# Patient Record
Sex: MALE | Race: WHITE | NOT HISPANIC OR LATINO | Employment: OTHER | URBAN - METROPOLITAN AREA
[De-identification: names, ages, dates, MRNs, and addresses within clinical notes are randomized per-mention and may not be internally consistent; named-entity substitution may affect disease eponyms.]

---

## 2018-08-21 ENCOUNTER — HOME CARE/HOSPICE - HEALTHEAST (OUTPATIENT)
Dept: HOME HEALTH SERVICES | Facility: HOME HEALTH | Age: 53
End: 2018-08-21

## 2021-05-31 ENCOUNTER — RECORDS - HEALTHEAST (OUTPATIENT)
Dept: ADMINISTRATIVE | Facility: CLINIC | Age: 56
End: 2021-05-31

## 2021-06-16 PROBLEM — Z95.2 S/P AVR (AORTIC VALVE REPLACEMENT): Status: ACTIVE | Noted: 2018-08-21

## 2021-06-16 PROBLEM — S80.11XA HEMATOMA OF RIGHT LOWER EXTREMITY: Status: ACTIVE | Noted: 2018-08-21

## 2021-06-16 PROBLEM — G62.9 NEUROPATHY: Status: ACTIVE | Noted: 2018-08-21

## 2021-06-16 PROBLEM — K59.1 FUNCTIONAL DIARRHEA: Status: ACTIVE | Noted: 2018-08-21

## 2021-06-16 PROBLEM — F41.9 ANXIETY: Status: ACTIVE | Noted: 2018-08-21

## 2021-06-16 PROBLEM — R23.3 SPONTANEOUS HEMATOMA OF LOWER LEG: Status: ACTIVE | Noted: 2018-08-21

## 2021-06-16 PROBLEM — N17.9 ACUTE RENAL INJURY (H): Status: ACTIVE | Noted: 2018-08-17

## 2021-07-03 ENCOUNTER — HEALTH MAINTENANCE LETTER (OUTPATIENT)
Age: 56
End: 2021-07-03

## 2021-10-23 ENCOUNTER — HEALTH MAINTENANCE LETTER (OUTPATIENT)
Age: 56
End: 2021-10-23

## 2021-11-05 ENCOUNTER — TRANSCRIBE ORDERS (OUTPATIENT)
Dept: OTHER | Age: 56
End: 2021-11-05

## 2021-11-05 ENCOUNTER — TELEPHONE (OUTPATIENT)
Dept: OPHTHALMOLOGY | Facility: CLINIC | Age: 56
End: 2021-11-05
Payer: MEDICARE

## 2021-11-05 DIAGNOSIS — H54.7 VISION LOSS: Primary | ICD-10-CM

## 2021-11-05 NOTE — TELEPHONE ENCOUNTER
[facility] Call Center    Phone Message    May a detailed message be left on voicemail: no     Reason for Call: Appointment Intake    Referring physicians name: Barbie Buckner CNP  Clinic name: [facility]  Clinic phone #: [phone]  Clinic fax #: [phone]  Clinic contact name: Shaina  Clinic contact's direct #: [phone]  Diagnosis and/or Symptoms: Vision loss [H54.7]  My Clinical Question Is:   Dr. Cooper - Vision loss after intracerebral hemorrhage, Right-sided hemianopsia, also difficulty on left side. Central vision spared. Difficulty crossing midline        Action Taken: Message routed to:  [facility] [[id number]] - not in Dr. Cooper's protos and Vision loss is listed as Red Flag    Travel Screening: Not Applicable

## 2021-11-08 NOTE — TELEPHONE ENCOUNTER
Called and unable to leave a voice message due to mailbox not set up. Benson needs an appt with Dr. Cooper for next available.     Susana Kern Communication Facilitator on 11/8/2021 at 11:29 AM

## 2021-11-11 ENCOUNTER — TELEPHONE (OUTPATIENT)
Dept: OPHTHALMOLOGY | Facility: CLINIC | Age: 56
End: 2021-11-11
Payer: MEDICARE

## 2021-11-11 NOTE — TELEPHONE ENCOUNTER
Called and spoke to Nato. I asked him if this was a good time to call him. Nato asked if we could call him another time to make an appt with Dr. Cooper.     Susana Kern Communication Facilitator on 11/11/2021 at 9:46 AM

## 2021-12-18 ENCOUNTER — HEALTH MAINTENANCE LETTER (OUTPATIENT)
Age: 56
End: 2021-12-18

## 2022-01-04 ENCOUNTER — OFFICE VISIT (OUTPATIENT)
Dept: OPHTHALMOLOGY | Facility: CLINIC | Age: 57
End: 2022-01-04
Attending: OPHTHALMOLOGY
Payer: MEDICARE

## 2022-01-04 DIAGNOSIS — H53.40 VISUAL FIELD DEFECT: ICD-10-CM

## 2022-01-04 DIAGNOSIS — H53.461 HOMONYMOUS HEMIANOPIA, RIGHT: Primary | ICD-10-CM

## 2022-01-04 DIAGNOSIS — H53.40 VISUAL FIELD DEFECT: Primary | ICD-10-CM

## 2022-01-04 PROCEDURE — G0463 HOSPITAL OUTPT CLINIC VISIT: HCPCS

## 2022-01-04 PROCEDURE — 99204 OFFICE O/P NEW MOD 45 MIN: CPT | Performed by: OPHTHALMOLOGY

## 2022-01-04 PROCEDURE — 92081 LIMITED VISUAL FIELD XM: CPT | Performed by: OPHTHALMOLOGY

## 2022-01-04 ASSESSMENT — SLIT LAMP EXAM - LIDS
COMMENTS: NORMAL
COMMENTS: NORMAL

## 2022-01-04 ASSESSMENT — CUP TO DISC RATIO
OD_RATIO: 0.1
OS_RATIO: 0.1

## 2022-01-04 ASSESSMENT — REFRACTION_MANIFEST
OD_SPHERE: +0.75
OS_SPHERE: +0.75
OD_CYLINDER: +0.25
OS_CYLINDER: +0.25
OD_SPHERE: +0.75
OS_SPHERE: +0.25
OS_CYLINDER: SPHERE
OD_AXIS: 050
OD_CYLINDER: +0.25
OD_AXIS: 180
OS_AXIS: 015

## 2022-01-04 ASSESSMENT — CONF VISUAL FIELD
OS_INFERIOR_NASAL_RESTRICTION: 1
OD_SUPERIOR_TEMPORAL_RESTRICTION: 1
OS_SUPERIOR_NASAL_RESTRICTION: 1
OD_INFERIOR_TEMPORAL_RESTRICTION: 1

## 2022-01-04 ASSESSMENT — TONOMETRY
OD_IOP_MMHG: 6
OS_IOP_MMHG: 6
IOP_METHOD: ICARE

## 2022-01-04 ASSESSMENT — EXTERNAL EXAM - LEFT EYE: OS_EXAM: NORMAL

## 2022-01-04 ASSESSMENT — VISUAL ACUITY
METHOD: SNELLEN - LINEAR
OS_SC+: +1
OD_SC: 20/70
OS_SC: 20/80
OD_SC+: +1

## 2022-01-04 ASSESSMENT — EXTERNAL EXAM - RIGHT EYE: OD_EXAM: NORMAL

## 2022-01-04 NOTE — LETTER
2022         RE:  :  MRN: Benson Sage  1965  [id number]     Dear COLBY Buckner,    Thank you for asking me to see your very pleasant patient, Benson Sage, in neuro-ophthalmic consultation.  I would like to thank you for sending your records and I have summarized them in the history of present illness. He presented with his spouse who provided additional history.  My assessment and plan are below.  For further details, please see my attached clinic note.      Assessment & Plan     Benson Sage is a 56 year old male with the following diagnoses:   1. Homonymous hemianopia, right    2. Visual field defect         Patient was sent for consultation by Dr. Barbie Buckner for vision changes following intracerebral hemorrhage.     HPI:  The week before 10/23/21, he developed severe headache. He was noticing that he could not type on his phone coherently.  He has a history of cluster headache and he thought that this was a recurrence of this.  He developed confusion and did not know where he was, couldn't speak coherently, and though the closet was the bathroom.  His vision became very blurred to both sides but this was mostly on the right.  He cannot read well.  He can sound out words. He is not driving. He can navigate a new environment. He did not have surgery for the bleed.  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  His blood pressure was very elevated.       Independent historians:  Patient and spouse     Review of outside testin21  FINDINGS:   INTRACRANIAL CONTENTS: Parenchymal hemorrhage is again demonstrated in the left occipital lobe with hemosiderin rim along its periphery. There is marked intrinsic T1 hyperintensity centrally, which reduces sensitivity for enhancement, though none is demonstrated within the hematoma bed. Mild residual vasogenic edema persists along the medial aspect of the hematoma bed, markedly decreased from 10/29/2021. A small  "volume of susceptibility signal tracks along the left occipital horn. There is unchanged punctate susceptibility signal in the inferior right occipital lobe. There is no evidence of diffusion restriction to suggest acute infarction. The midline is preserved and the basal cisterns remain patent. Mild generalized cerebral atrophy. No hydrocephalus. Normal position of the cerebellar tonsils. The major intracranial flow voids are unremarkable.     SELLA: No abnormality accounting for technique.     OSSEOUS STRUCTURES/SOFT TISSUES: Unremarkable marrow signal. Unremarkable extracranial soft tissues. Limited images of the upper cervical spine demonstrate no acute abnormality.       ORBITS: No abnormality accounting for technique.     SINUSES/MASTOIDS: Mild to moderate mucosal thickening scattered about the paranasal sinuses. No middle ear or mastoid effusion.     IMPRESSION:   1.  Decreased size of left occipital intraparenchymal hematoma, compared to the MRI of 10/29/2021. Vasogenic edema has also significantly decreased.   2.  Intrinsic T1-hyperintensity of the hematoma reduces sensitivity for underlying enhancement, though there is no specific evidence of enhancement on this examination.   3.  No new intracranial hemorrhage, midline shift or acute/subacute ischemic change demonstrated.      My interpretation performed today of outside testing:  Large left occipital lobe hemorrhage       Review of outside clinical notes:  Barbie Buckner clinic notes     Past medical history:  Diabetes mellitus last HbA1c \"slightly high\"   Hypertension  Heart valve replacement  Colon cancer status-post resection October 2010  High cholesterol  Anxiety     Medications:   acetaminophen  albuterol  amLODIPine  atenolol  atorvastatin  chlorhexidine  diphenoxylate-atropine  EPINEPHrine  ergocalciferol  fenofibrate  gabapentin  hydrochlorothiazide  LANsoprazole  lidocaine  LORazepam  magnesium oxide  meclizine  ondansetron  oxyCODONE  potassium " chloride  tadalafil  warfarin ANTICOAGULANT      Exam:  Visual acuity 20/70 right eye 20/80 left eye.  Color vision 11/11 right eye and 11/11 left eye.  Pupils normal both eyes.  He has a right homonymous hemianopia on confrontation visual fields.  Anterior segment exam and Fundus exam unremarkable in both eyes.  .      Tests ordered and interpreted today:  Visual field circumferential constriction in both eyes.     Discussion of management / interpretation with another provider:   None    Assessment/Plan:   It is my impression that patient has a right homonymous hemianopia in setting of left occipital hematoma.  He indicates that he had some improvement initially but that it seems to have plateaued.  I think it still possible that this may continue to improve.  We discussed different reading techniques.  Peli prisms would be an option to assist with navigating if his visual field deficit does not continue to improve.  I indicated to him that he does not qualified to legally drive a car in the state of [place] and he expresses understanding.  At this point, I would recommend follow-up in 3 to 4 months to reassess his peripheral vision.        Again, thank you for allowing me to participate in the care of your patient.      Sincerely,    Jose Cooper MD  Professor  Ophthalmology Residency   Director of Neuro-Ophthalmology  [employer] [facility] [employer] Endowed Chair  Departments of Ophthalmology, Neurology, and Neurosurgery  [place] [address]  [address] [place]  [address]  T - [phone]   - [phone]  [email or web address]    CC: Alex Cade  [address]  [facility] [address]  Via Outside Provider Messaging     [facility]  [address] [place] [address]  Via Fax: [phone]    DX = homonymous hemianopia, hemorrhagic stroke

## 2022-01-04 NOTE — PROGRESS NOTES
Assessment & Plan     Benson Sage is a 56 year old male with the following diagnoses:   1. Homonymous hemianopia, right    2. Visual field defect         Patient was sent for consultation by Dr. Barbie Buckner for vision changes following intracerebral hemorrhage.     HPI:  The week before 10/23/21, he developed severe headache. He was noticing that he could not type on his phone coherently.  He has a history of cluster headache and he thought that this was a recurrence of this.  He developed confusion and did not know where he was, couldn't speak coherently, and though the closet was the bathroom.  His vision became very blurred to both sides but this was mostly on the right.  He cannot read well.  He can sound out words. He is not driving. He can navigate a new environment. He did not have surgery for the bleed.  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  His blood pressure was very elevated.       Independent historians:  Patient and spouse     Review of outside testin21  FINDINGS:   INTRACRANIAL CONTENTS: Parenchymal hemorrhage is again demonstrated in the left occipital lobe with hemosiderin rim along its periphery. There is marked intrinsic T1 hyperintensity centrally, which reduces sensitivity for enhancement, though none is demonstrated within the hematoma bed. Mild residual vasogenic edema persists along the medial aspect of the hematoma bed, markedly decreased from 10/29/2021. A small volume of susceptibility signal tracks along the left occipital horn. There is unchanged punctate susceptibility signal in the inferior right occipital lobe. There is no evidence of diffusion restriction to suggest acute infarction. The midline is preserved and the basal cisterns remain patent. Mild generalized cerebral atrophy. No hydrocephalus. Normal position of the cerebellar tonsils. The major intracranial flow voids are unremarkable.     SELLA: No abnormality accounting for  "technique.     OSSEOUS STRUCTURES/SOFT TISSUES: Unremarkable marrow signal. Unremarkable extracranial soft tissues. Limited images of the upper cervical spine demonstrate no acute abnormality.       ORBITS: No abnormality accounting for technique.     SINUSES/MASTOIDS: Mild to moderate mucosal thickening scattered about the paranasal sinuses. No middle ear or mastoid effusion.     IMPRESSION:   1.  Decreased size of left occipital intraparenchymal hematoma, compared to the MRI of 10/29/2021. Vasogenic edema has also significantly decreased.   2.  Intrinsic T1-hyperintensity of the hematoma reduces sensitivity for underlying enhancement, though there is no specific evidence of enhancement on this examination.   3.  No new intracranial hemorrhage, midline shift or acute/subacute ischemic change demonstrated.      My interpretation performed today of outside testing:  Large left occipital lobe hemorrhage       Review of outside clinical notes:  Barbie Buckner clinic notes     Past medical history:  Diabetes mellitus last HbA1c \"slightly high\"   Hypertension  Heart valve replacement  Colon cancer status-post resection October 2010  High cholesterol  Anxiety     Medications:   acetaminophen  albuterol  amLODIPine  atenolol  atorvastatin  chlorhexidine  diphenoxylate-atropine  EPINEPHrine  ergocalciferol  fenofibrate  gabapentin  hydrochlorothiazide  LANsoprazole  lidocaine  LORazepam  magnesium oxide  meclizine  ondansetron  oxyCODONE  potassium chloride  tadalafil  warfarin ANTICOAGULANT      Exam:  Visual acuity 20/70 right eye 20/80 left eye.  Color vision 11/11 right eye and 11/11 left eye.  Pupils normal both eyes.  He has a right homonymous hemianopia on confrontation visual fields.  Anterior segment exam and Fundus exam unremarkable in both eyes.  .      Tests ordered and interpreted today:  Visual field circumferential constriction in both eyes.     Discussion of management / interpretation with another provider: "   None    Assessment/Plan:   It is my impression that patient has a right homonymous hemianopia in setting of left occipital hematoma.  He indicates that he had some improvement initially but that it seems to have plateaued.  I think it still possible that this may continue to improve.  We discussed different reading techniques.  Peli prisms would be an option to assist with navigating if his visual field deficit does not continue to improve.  I indicated to him that he does not qualified to legally drive a car in the state of [place] and he expresses understanding.  At this point, I would recommend follow-up in 3 to 4 months to reassess his peripheral vision.         Attending Physician Attestation:  Complete documentation of historical and exam elements from today's encounter can be found in the full encounter summary report (not reduplicated in this progress note).  I personally obtained the chief complaint(s) and history of present illness.  I confirmed and edited as necessary the review of systems, past medical/surgical history, family history, social history, and examination findings as documented by others; and I examined the patient myself.  I personally reviewed the relevant tests, images, and reports as documented above.  I formulated and edited as necessary the assessment and plan and discussed the findings and management plan with the patient and family. - Jose Cooper MD

## 2022-01-04 NOTE — Clinical Note
2022       RE: Benson Sage  [address] [address]     Dear Colleague,    Thank you for referring your patient, Benson Sage, to the [facility] at [facility]. Please see a copy of my visit note below.         Assessment & Plan     Benson Sage is a 56 year old male with the following diagnoses:   1. Vision loss         Patient was sent for consultation by Dr. Barbie Buckner for vision changes following intracerebral hemorrhage.     HPI:  The week before 10/23/21, he developed severe headache. He was noticing that he could not type on his phone coherently.  He has a history of cluster headache and he thought that this was a recurrence of this.  He developed confusion and did not know where he was, couldn't speak coherently, and though the closet was the bathroom.  His vision became very blurred to both sides but this was mostly on the right.  He cannot read well.  He can sound out words. He is not driving. He can navigate a new environment. He did not have surgery for the bleed.  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  His blood pressure was very elevated.       Independent historians:  Patient and spouse     Review of outside testin21  FINDINGS:   INTRACRANIAL CONTENTS: Parenchymal hemorrhage is again demonstrated in the left occipital lobe with hemosiderin rim along its periphery. There is marked intrinsic T1 hyperintensity centrally, which reduces sensitivity for enhancement, though none is demonstrated within the hematoma bed. Mild residual vasogenic edema persists along the medial aspect of the hematoma bed, markedly decreased from 10/29/2021. A small volume of susceptibility signal tracks along the left occipital horn. There is unchanged punctate susceptibility signal in the inferior right occipital lobe. There is no evidence of diffusion restriction to  "suggest acute infarction. The midline is preserved and the basal cisterns remain patent. Mild generalized cerebral atrophy. No hydrocephalus. Normal position of the cerebellar tonsils. The major intracranial flow voids are unremarkable.     SELLA: No abnormality accounting for technique.     OSSEOUS STRUCTURES/SOFT TISSUES: Unremarkable marrow signal. Unremarkable extracranial soft tissues. Limited images of the upper cervical spine demonstrate no acute abnormality.       ORBITS: No abnormality accounting for technique.     SINUSES/MASTOIDS: Mild to moderate mucosal thickening scattered about the paranasal sinuses. No middle ear or mastoid effusion.     IMPRESSION:   1.  Decreased size of left occipital intraparenchymal hematoma, compared to the MRI of 10/29/2021. Vasogenic edema has also significantly decreased.   2.  Intrinsic T1-hyperintensity of the hematoma reduces sensitivity for underlying enhancement, though there is no specific evidence of enhancement on this examination.   3.  No new intracranial hemorrhage, midline shift or acute/subacute ischemic change demonstrated.      My interpretation performed today of outside testing:  Large left occipital lobe hemorrhage       Review of outside clinical notes:  Barbie Buckner clinic notes     Past medical history:  Diabetes mellitus last HbA1c \"slightly high\"   Hypertension  Heart valve replacement  Colon cancer status-post resection October 2010  High cholesterol  Anxiety     Medications:   acetaminophen  albuterol  amLODIPine  atenolol  atorvastatin  chlorhexidine  diphenoxylate-atropine  EPINEPHrine  ergocalciferol  fenofibrate  gabapentin  hydrochlorothiazide  LANsoprazole  lidocaine  LORazepam  magnesium oxide  meclizine  ondansetron  oxyCODONE  potassium chloride  tadalafil  warfarin ANTICOAGULANT      Exam:  Visual acuity 20/70 right eye 20/80 left eye.  Color vision 11/11 right eye and 11/11 left eye.  Pupils normal both eyes.  He has a right homonymous " hemianopia on confrontation visual fields.  Anterior segment exam and Fundus exam unremarkable.      Tests ordered and interpreted today:  Visual field circumferential constriction      Discussion of management / interpretation with another provider:   None    Assessment/Plan:   It is my impression that patient has right homonymous hemianopia in setting of left occipital hematoma.  This may continue to improve.  We discussed different reading techniques.  Peli prisms would be an option to assist with navigating if his visual field deficit does not continue to improve.          Attending Physician Attestation:  Complete documentation of historical and exam elements from today's encounter can be found in the full encounter summary report (not reduplicated in this progress note).  I personally obtained the chief complaint(s) and history of present illness.  I confirmed and edited as necessary the review of systems, past medical/surgical history, family history, social history, and examination findings as documented by others; and I examined the patient myself.  I personally reviewed the relevant tests, images, and reports as documented above.  I formulated and edited as necessary the assessment and plan and discussed the findings and management plan with the patient and family. - Jose Cooper MD              Again, thank you for allowing me to participate in the care of your patient.      Sincerely,    Jose Cooper MD

## 2022-01-04 NOTE — NURSING NOTE
Chief Complaints and History of Present Illnesses   Patient presents with     Visual Field Defect Evaluation     Chief Complaint(s) and History of Present Illness(es)     Visual Field Defect Evaluation               Comments     Benson Sage is a 56 year old male who presents today for    1. Vision loss   Intraparenchymal hematoma of brain, left   10/23/2021  Patient had a bitemporal homonymous hemianopsia. Feels significant improvement in visual field in the left >> right since onset.    Latest CT brain showed improvement.     EXAM: CT HEAD BRAIN WO  LOCATION: [facility]  DATE/TIME: 11/22/2021 3:54 PM    INDICATION: Intracranial hemorrhage (hc).  COMPARISON: 11/04/21, 11/01/21, 10/29/21 MR.  TECHNIQUE: Routine CT Head without IV contrast. Multiplanar reformats. Dose reduction techniques were used.    FINDINGS:  Intraparenchymal hematoma centered within the left occipital lobe has significantly decreased in size compared to the prior exam currently measuring approximately 3.6 x 2.1 x 2.1 cm, previously 4.8 x 2.8 x 4.1 cm. The degree of surrounding hypoattenuation consistent with vasogenic edema has slightly improved. No significant mass effect.    Mild volume loss is present. White matter hypoattenuation likely represents chronic small vessel ischemic change. Calcification along the left inferior frontal gyrus within the sylvian fissure.    The visualized calvarium, tympanic cavities, mastoid cavities, and extracranial soft tissues are unremarkable. Mild pansinus mucosal thickening.    IMPRESSION:  1.  Decreased size of intraparenchymal hemorrhage within the left occipital lobe.        Last eye exam was two years ago.     Amy OCONNOR 1:31 PM January 4, 2022

## 2022-04-07 DIAGNOSIS — H53.40 VISUAL FIELD DEFECT: Primary | ICD-10-CM

## 2022-05-17 ENCOUNTER — OFFICE VISIT (OUTPATIENT)
Dept: OPHTHALMOLOGY | Facility: CLINIC | Age: 57
End: 2022-05-17
Attending: OPHTHALMOLOGY
Payer: MEDICARE

## 2022-05-17 DIAGNOSIS — H53.40 VISUAL FIELD DEFECT: ICD-10-CM

## 2022-05-17 PROCEDURE — G0463 HOSPITAL OUTPT CLINIC VISIT: HCPCS | Performed by: TECHNICIAN/TECHNOLOGIST

## 2022-05-17 PROCEDURE — 99213 OFFICE O/P EST LOW 20 MIN: CPT | Performed by: OPHTHALMOLOGY

## 2022-05-17 PROCEDURE — 92081 LIMITED VISUAL FIELD XM: CPT | Performed by: OPHTHALMOLOGY

## 2022-05-17 ASSESSMENT — TONOMETRY
OS_IOP_MMHG: 15
OD_IOP_MMHG: 14
IOP_METHOD: ICARE

## 2022-05-17 ASSESSMENT — CONF VISUAL FIELD
OS_NORMAL: 1
OD_NORMAL: 1
METHOD: COUNTING FINGERS

## 2022-05-17 ASSESSMENT — VISUAL ACUITY
OS_SC: 20/20
OD_SC: 20/20
METHOD: SNELLEN - LINEAR
OS_SC+: -3
OD_SC+: -2

## 2022-05-17 ASSESSMENT — EXTERNAL EXAM - LEFT EYE: OS_EXAM: NORMAL

## 2022-05-17 ASSESSMENT — SLIT LAMP EXAM - LIDS
COMMENTS: NORMAL
COMMENTS: NORMAL

## 2022-05-17 ASSESSMENT — EXTERNAL EXAM - RIGHT EYE: OD_EXAM: NORMAL

## 2022-05-17 NOTE — PROGRESS NOTES
Assessment & Plan     Benson Sage is a 57 year old male with the following diagnoses:   1. Visual field defect         Patient was last seen on 1/4/22 for evaluation of right homonymous hemianopia in the setting of left occipital hematoma.  I felt it was possible that his vision may continue to improve with option of peli prisms to assist with navigation if his peripheral vision did not improve.      Today, he states that his vision is much better since his last visit.  He feels like his vision is normal.  His visual field is MUCH better. He now qualifies to drive.      Follow up as needed for worsening symptoms.                 Attending Physician Attestation:  Complete documentation of historical and exam elements from today's encounter can be found in the full encounter summary report (not reduplicated in this progress note).  I personally obtained the chief complaint(s) and history of present illness.  I confirmed and edited as necessary the review of systems, past medical/surgical history, family history, social history, and examination findings as documented by others; and I examined the patient myself.  I personally reviewed the relevant tests, images, and reports as documented above.  I formulated and edited as necessary the assessment and plan and discussed the findings and management plan with the patient and family. - Jose Cooper MD

## 2022-10-09 ENCOUNTER — HEALTH MAINTENANCE LETTER (OUTPATIENT)
Age: 57
End: 2022-10-09

## 2022-11-26 ENCOUNTER — HEALTH MAINTENANCE LETTER (OUTPATIENT)
Age: 57
End: 2022-11-26

## 2023-05-27 ENCOUNTER — HEALTH MAINTENANCE LETTER (OUTPATIENT)
Age: 58
End: 2023-05-27

## 2024-08-03 ENCOUNTER — HEALTH MAINTENANCE LETTER (OUTPATIENT)
Age: 59
End: 2024-08-03